# Patient Record
Sex: MALE | Race: BLACK OR AFRICAN AMERICAN | NOT HISPANIC OR LATINO | Employment: FULL TIME | URBAN - METROPOLITAN AREA
[De-identification: names, ages, dates, MRNs, and addresses within clinical notes are randomized per-mention and may not be internally consistent; named-entity substitution may affect disease eponyms.]

---

## 2021-07-23 ENCOUNTER — OFFICE VISIT (OUTPATIENT)
Dept: URGENT CARE | Age: 26
End: 2021-07-23
Payer: COMMERCIAL

## 2021-07-23 VITALS
SYSTOLIC BLOOD PRESSURE: 138 MMHG | OXYGEN SATURATION: 98 % | TEMPERATURE: 97.7 F | HEART RATE: 73 BPM | DIASTOLIC BLOOD PRESSURE: 85 MMHG | RESPIRATION RATE: 16 BRPM

## 2021-07-23 DIAGNOSIS — Z02.89 ENCOUNTER TO OBTAIN EXCUSE FROM WORK: Primary | ICD-10-CM

## 2021-07-23 DIAGNOSIS — R19.5 HARD STOOL: ICD-10-CM

## 2021-07-23 PROCEDURE — G0382 LEV 3 HOSP TYPE B ED VISIT: HCPCS | Performed by: PHYSICIAN ASSISTANT

## 2021-07-23 NOTE — PATIENT INSTRUCTIONS
Stay hydrated with lots of water/fluids  Follow-up with PCP in the next 1-2 days for reexamination and to ensure resolution of symptoms  Go to the ED if any fevers, unable to stay hydrated, abdominal pain, nausea, vomiting, constipation, dairrhea, chest pain, shortness of breath, new or worsening symptoms or other concerning symptoms  Constipation   WHAT YOU NEED TO KNOW:   Constipation means you have hard, dry bowel movements, or you go longer than usual between bowel movements  DISCHARGE INSTRUCTIONS:   Call your doctor if:   · You have blood in your bowel movements  · You have a fever and abdominal pain with the constipation  · Your constipation gets worse  · You start to vomit  · You have questions or concerns about your condition or care  Medicines:   · Medicine  such as a laxative may help relax and loosen your intestines to help you have a bowel movement  Your provider may recommend you only use laxatives for a short time  Long-term use may make your bowels dependent on the medicine  · Take your medicine as directed  Contact your healthcare provider if you think your medicine is not helping or if you have side effects  Tell him of her if you are allergic to any medicine  Keep a list of the medicines, vitamins, and herbs you take  Include the amounts, and when and why you take them  Bring the list or the pill bottles to follow-up visits  Carry your medicine list with you in case of an emergency  Prevent constipation:   · Drink liquids as directed  You may need to drink extra liquids to help soften and move your bowels  Ask how much liquid to drink each day and which liquids are best for you  · Eat high-fiber foods  This may help decrease constipation by adding bulk to your bowel movements  High-fiber foods include fruit, vegetables, whole-grain breads and cereals, and beans  Your healthcare provider or dietitian can help you create a high-fiber meal plan   Your provider may also recommend a fiber supplement if you cannot get enough fiber from food  · Exercise regularly  Regular physical activity can help stimulate your intestines  Walking is a good exercise to prevent or relieve constipation  Ask which exercises are best for you  · Schedule a time each day to have a bowel movement  This may help train your body to have regular bowel movements  Bend forward while you are on the toilet to help move the bowel movement out  Sit on the toilet for at least 10 minutes, even if you do not have a bowel movement  · Talk to your healthcare provider about your medicines  Certain medicines, such as opioids, can cause constipation  Your provider may be able to make medicine changes  For example, he or she may change the kind of medicine, or change when you take it  Follow up with your healthcare provider as directed:  Write down your questions so you remember to ask them during your visits  © Copyright [employer] 2021 Information is for End User's use only and may not be sold, redistributed or otherwise used for commercial purposes  All illustrations and images included in CareNotes® are the copyrighted property of [employer]  or [facility] Arnaud Garrett   The above information is an  only  It is not intended as medical advice for individual conditions or treatments  Talk to your doctor, nurse or pharmacist before following any medical regimen to see if it is safe and effective for you

## 2021-07-23 NOTE — LETTER
July 23, 2021     Patient: Moustapha Sparrow   YOB: 1995   Date of Visit: 7/23/2021       To Whom It May Concern:     Sir Silver Aguilar was seen on 7/23/2021  If you have any questions or concerns, please don't hesitate to call           Sincerely,        Min Cheek PA-C    CC: No Recipients

## 2021-07-23 NOTE — PROGRESS NOTES
[facility]        NAME: Alexandrea Suggs is a 23 y o  male  : 1995    MRN: [id number]  DATE: 2021  TIME: 2:43 PM    Assessment and Plan   Encounter to obtain excuse from work [Z02 [id number]]  1  Encounter to obtain excuse from work     2  Hard stool         Patient states he has MiraLax at home that he is going to use  Manasa Kristine Has a history of constipation and harder stools  Last bowel movement was yesterday and patient reports BM was a smaller hard stool  States the MiraLax which works for him  States he only came for a work note so that he could use the MiraLax and stay at home  He denies any abdominal pain or any other complaints at this time  States he only came for a work excuse  Patient Instructions     Stay hydrated with lots of water/fluids  Follow-up with PCP in the next 1-2 days for reexamination and to ensure resolution of symptoms  Go to the ED if any fevers, unable to stay hydrated, abdominal pain, nausea, vomiting, constipation, dairrhea, chest pain, shortness of breath, new or worsening symptoms or other concerning symptoms  Chief Complaint     Chief Complaint   Patient presents with    Constipation     pt states has issues with constipation, planning on taking a laxative but then he's in the bathroom all day so he needs a work note         History of Present Illness         24-year-old male presents for a work note  States he is feeling slightly constipated, states yesterday he had a smaller hard stool  States he does have a history of constipation- and it feels similar  States he has used MiraLax in the past with significant improvement but he did have to miss work after using it  States he came for a work note so that he could take the MiraLax and stay at home  He denies any fever, nausea, vomiting, blood in stool, chest pain, chest tightness, shortness of breath, urinary symptoms or other complaints    Denies any sick contacts, recent travel, COVID exposures  States he only came for a work note      Review of Systems   Review of Systems   Constitutional: Negative for activity change, appetite change, fatigue and fever  HENT: Negative for ear pain, rhinorrhea, sore throat and trouble swallowing  Eyes: Negative for itching and visual disturbance  Respiratory: Negative for cough, shortness of breath and wheezing  Cardiovascular: Negative for chest pain and leg swelling  Gastrointestinal: Positive for constipation (hard, smaller stool yesterday)  Negative for abdominal distention, abdominal pain, anal bleeding, diarrhea, nausea and vomiting  Genitourinary: Negative  Negative for decreased urine volume  Musculoskeletal: Negative for back pain, joint swelling, neck pain and neck stiffness  Skin: Negative for rash  Neurological: Negative for dizziness, seizures, weakness, numbness and headaches  All other systems reviewed and are negative  Current Medications     No current outpatient medications on file  Current Allergies     Allergies as of 07/23/2021    (No Known Allergies)            The following portions of the patient's history were reviewed and updated as appropriate: allergies, current medications, past family history, past medical history, past social history, past surgical history and problem list      History reviewed  No pertinent past medical history  History reviewed  No pertinent surgical history  History reviewed  No pertinent family history  Medications have been verified  Objective   /85   Pulse 73   Temp 97 7 °F (36 5 °C)   Resp 16   SpO2 98%        Physical Exam     Physical Exam  Vitals and nursing note reviewed  Constitutional:       General: He is not in acute distress  Appearance: He is well-developed  HENT:      Mouth/Throat:      Mouth: Mucous membranes are moist    Cardiovascular:      Rate and Rhythm: Normal rate and regular rhythm        Heart sounds: Normal heart sounds  Pulmonary:      Effort: Pulmonary effort is normal       Breath sounds: Normal breath sounds  No wheezing  Abdominal:      General: Bowel sounds are normal       Palpations: Abdomen is soft  Tenderness: There is no abdominal tenderness  There is no guarding or rebound  Genitourinary:     Comments: Deferred  Musculoskeletal:      Cervical back: Normal range of motion and neck supple  Skin:     Capillary Refill: Capillary refill takes less than 2 seconds  Neurological:      Mental Status: He is alert and oriented to person, place, and time     Psychiatric:         Behavior: Behavior normal